# Patient Record
Sex: MALE | Race: BLACK OR AFRICAN AMERICAN | Employment: FULL TIME | URBAN - METROPOLITAN AREA
[De-identification: names, ages, dates, MRNs, and addresses within clinical notes are randomized per-mention and may not be internally consistent; named-entity substitution may affect disease eponyms.]

---

## 2017-10-02 ENCOUNTER — HOSPITAL ENCOUNTER (OUTPATIENT)
Dept: ULTRASOUND IMAGING | Age: 57
Discharge: HOME | End: 2017-10-02
Attending: INTERNAL MEDICINE
Payer: COMMERCIAL

## 2017-10-02 DIAGNOSIS — M79.661 PAIN AND SWELLING OF LOWER LEG, RIGHT: ICD-10-CM

## 2017-10-02 DIAGNOSIS — M79.89 PAIN AND SWELLING OF LOWER LEG, RIGHT: ICD-10-CM

## 2017-10-02 PROCEDURE — 93971 EXTREMITY STUDY: CPT

## 2017-12-14 PROBLEM — R73.01 IMPAIRED FASTING GLUCOSE: Status: ACTIVE | Noted: 2017-12-14

## 2017-12-14 PROBLEM — J45.30 MILD PERSISTENT ASTHMA WITHOUT COMPLICATION: Status: ACTIVE | Noted: 2017-12-14

## 2018-01-04 ENCOUNTER — APPOINTMENT (RX ONLY)
Dept: URBAN - METROPOLITAN AREA CLINIC 349 | Facility: CLINIC | Age: 58
Setting detail: DERMATOLOGY
End: 2018-01-04

## 2018-01-04 DIAGNOSIS — L82.1 OTHER SEBORRHEIC KERATOSIS: ICD-10-CM

## 2018-01-04 DIAGNOSIS — D22 MELANOCYTIC NEVI: ICD-10-CM

## 2018-01-04 PROBLEM — D22.5 MELANOCYTIC NEVI OF TRUNK: Status: ACTIVE | Noted: 2018-01-04

## 2018-01-04 PROCEDURE — ? PATHOLOGY BILLING

## 2018-01-04 PROCEDURE — ? COUNSELING

## 2018-01-04 PROCEDURE — 99242 OFF/OP CONSLTJ NEW/EST SF 20: CPT | Mod: 25

## 2018-01-04 PROCEDURE — A4550 SURGICAL TRAYS: HCPCS

## 2018-01-04 PROCEDURE — ? SHAVE REMOVAL

## 2018-01-04 PROCEDURE — 88305 TISSUE EXAM BY PATHOLOGIST: CPT

## 2018-01-04 PROCEDURE — 11301 SHAVE SKIN LESION 0.6-1.0 CM: CPT

## 2018-01-04 ASSESSMENT — LOCATION DETAILED DESCRIPTION DERM
LOCATION DETAILED: LEFT RADIAL DORSAL HAND
LOCATION DETAILED: RIGHT RADIAL DORSAL HAND
LOCATION DETAILED: RIGHT DISTAL PRETIBIAL REGION
LOCATION DETAILED: RIGHT LATERAL BUTTOCK

## 2018-01-04 ASSESSMENT — LOCATION SIMPLE DESCRIPTION DERM
LOCATION SIMPLE: RIGHT PRETIBIAL REGION
LOCATION SIMPLE: RIGHT BUTTOCK
LOCATION SIMPLE: LEFT HAND
LOCATION SIMPLE: RIGHT HAND

## 2018-01-04 ASSESSMENT — LOCATION ZONE DERM
LOCATION ZONE: HAND
LOCATION ZONE: TRUNK
LOCATION ZONE: LEG

## 2018-01-04 NOTE — HPI: SKIN LESIONS
How Severe Is Your Skin Lesion?: moderate
Have Your Skin Lesions Been Treated?: not been treated
Is This A New Presentation, Or A Follow-Up?: Skin Lesions
Additional History: Has been having some leg pain.

## 2018-01-04 NOTE — PROCEDURE: SHAVE REMOVAL
Notification Instructions: Patient will be notified of biopsy results. However, patient instructed to call the office if not contacted within 2 weeks.
Hemostasis: Electrocautery
Consent was obtained from the patient. The risks and benefits to therapy were discussed in detail. Specifically, the risks of infection, scarring, bleeding, prolonged wound healing, incomplete removal, allergy to anesthesia, nerve injury and recurrence were addressed. Prior to the procedure, the treatment site was clearly identified and confirmed by the patient. All components of Universal Protocol/PAUSE Rule completed.
Medical Necessity Information: It is in your best interest to select a reason for this procedure from the list below. All of these items fulfill various CMS LCD requirements except the new and changing color options.
Biopsy Method: Dermablade
Size Of Lesion In Cm (Required): 0.7
Render Post-Care Instructions In Note?: yes
Anesthesia Volume In Cc: 0.5
Bill 65218 For Specimen Handling/Conveyance To Laboratory?: no
Accession #: [email or web address]
Path Notes (To The Dermatopathologist): Please check margins.
Anesthesia Type: 2% lidocaine with epinephrine
Billing Type: Third-Party Bill
X Size Of Lesion In Cm (Optional): 0
Medical Necessity Clause: This procedure was medically necessary because the lesion that was treated was: changing and rubbed by clothing.
Wound Care: Vaseline
Detail Level: Detailed
Post-Care Instructions: I reviewed with the patient in detail post-care instructions. Patient is to keep the biopsy site dry overnight, and then apply vaseline twice daily until healed. Patient may apply hydrogen peroxide soaks to remove any crusting. After the procedure, the patient was observed for 5-10 minutes and was oriented to person, place and time and demied feeling dizzy, queasy, and stated that they did not feel that they were going to faint.

## 2018-09-06 PROBLEM — M79.89 RIGHT LEG SWELLING: Status: ACTIVE | Noted: 2017-10-09

## 2018-09-06 PROBLEM — K64.8 INTERNAL HEMORRHOIDS WITHOUT COMPLICATION: Status: ACTIVE | Noted: 2018-09-06

## 2018-09-06 PROBLEM — K57.30 DIVERTICULOSIS OF COLON WITHOUT DIVERTICULITIS: Status: ACTIVE | Noted: 2018-09-06

## 2018-09-06 PROBLEM — E66.01 SEVERE OBESITY (BMI 35.0-39.9): Status: ACTIVE | Noted: 2018-09-06

## 2018-09-06 PROBLEM — M25.569 KNEE PAIN: Status: ACTIVE | Noted: 2017-10-09

## 2018-12-20 PROBLEM — Z83.3 FAMILY HISTORY OF DIABETES MELLITUS: Status: ACTIVE | Noted: 2018-12-20

## 2018-12-20 PROBLEM — J30.9 ALLERGIC RHINITIS: Status: ACTIVE | Noted: 2018-12-20

## 2019-07-25 ENCOUNTER — HOSPITAL ENCOUNTER (OUTPATIENT)
Dept: GENERAL RADIOLOGY | Age: 59
Discharge: HOME | End: 2019-07-25

## 2019-07-25 DIAGNOSIS — M54.6 ACUTE MIDLINE THORACIC BACK PAIN: ICD-10-CM

## 2019-09-04 ENCOUNTER — HOSPITAL ENCOUNTER (OUTPATIENT)
Dept: GENERAL RADIOLOGY | Age: 59
Discharge: HOME | End: 2019-09-04

## 2019-09-04 DIAGNOSIS — M79.18 LEFT BUTTOCK PAIN: ICD-10-CM

## 2019-09-04 DIAGNOSIS — G89.29 CHRONIC LEFT-SIDED LOW BACK PAIN WITHOUT SCIATICA: ICD-10-CM

## 2019-09-04 DIAGNOSIS — M54.50 CHRONIC LEFT-SIDED LOW BACK PAIN WITHOUT SCIATICA: ICD-10-CM

## 2019-09-05 NOTE — PROGRESS NOTES
Pt was made aware of his x-ray and aware [address] [place] [address] will be calling him to set up PT apps.

## 2019-09-19 NOTE — THERAPY EVALUATION
Heidi Jackson  : 1960  Payor: Alissa Read / Plan: [facility] / Product Type: PPO /  [address] at [address], [place], [address] [address]  [phone]   [phone]         OUTPATIENT PHYSICAL THERAPY:Initial Assessment 2019   ICD-10: Treatment Diagnosis:         Low back pain (M54.5)           Sciatica, left side (M54.32)        Lumbago with sciatica, left side (M54.42)    Muscle weakness (generalized) (M62.81)       Precautions/Allergies:   Patient has no known allergies. TREATMENT PLAN:  Effective Dates: 2019 TO 2019 (90 days). Frequency/Duration: 2 times a week for 90 Day(s) MEDICAL/REFERRING DIAGNOSIS:  Chronic left-sided low back pain without sciatica [M54.5, G89.29]   DATE OF ONSET: *2 weeks  REFERRING PHYSICIAN: Aaliyah Olsen MD Orders: *Eval and Treat  Return MD Appointment: *19     INITIAL ASSESSMENT:  Mr. Rajiv Holland presents with hx of low back pain--currently, he is not having symptoms today; however he reports previous onset of radicular symptoms L LE not long ago (2 weeks). He has been taking anti inflammatory which has helped. We went through exercises to make sure he has good strength/flexibility regimen at home. No pelvic malalignment upon initial evaluation but decreased posture. Heidi Jackson will benefit from skilled PT (medically necessary) to address above deficits affecting participation in basic ADLs and overall functional tolerance. Manual techniques (stretching, joint mobilizations, soft tissue mobilization/myofascial release), postural exercises/education, therapeutic techniques/activities, and HEP will be performed as appropriate addressing Mayela Starr's current condition. PROBLEM LIST (Impacting functional limitations):  1. Decreased Strength  2. Decreased ADL/Functional Activities  3. Decreased Transfer Abilities  4.  Decreased Ambulation Ability/Technique  5. Decreased Balance  6. Increased Pain  7. Decreased Activity Tolerance  8. Decreased Schley with Home Exercise Program INTERVENTIONS PLANNED: (Treatment may consist of any combination of the following)  1. Balance Exercise  2. Cold  3. Family Education  4. Gait Training  5. Home Exercise Program (HEP)  6. Manual Therapy  7. Neuromuscular Re-education/Strengthening  8. Range of Motion (ROM)  9. Therapeutic Activites  10. Therapeutic Exercise/Strengthening  11. Transcutaneous Electrical Nerve Stimulation (TENS)  12. Ultrasound (US)     GOALS: (Goals have been discussed and agreed upon with patient.)  Short Term Goals 4 weeks   1. Rodolfo Rolle will be independent with HEP  2. Rodolfo Rolle will *be able to perform work related duties without onset of pain and improved mobility. [address] [place] [address] will *be able to walk >30 minutes with <=1/10 pain and no difficulty. [address] [place] [address] will participate in static and dynamic balance activities to decrease the risk for falls  5. Rodolfo Rolle will tolerate manual therapy/joint mobilizations/soft tissue to increase ROM and decrease pain  6. Long Term Goals 8 weeks   1. Rodolfo Rolle will demonstrate a 10 point improvement on the Oswestry to show improvement in function  2. Rodolfo Rolle will report 0/10 pain at rest and during ADLs  3. Rodolfo Rolle will demonstrate 5/5 LE strength on manual muscle testing      OUTCOME MEASURE:   Tool Used: Modified Oswestry Low Back Pain Questionnaire  Score:  Initial: *29/50  Most Recent: X/50 (Date: -- )   Interpretation of Score: Each section is scored on a 0-5 scale, 5 representing the greatest disability. The scores of each section are added together for a total score of 50. MEDICAL NECESSITY:   · Skilled intervention continues to be required due to above signs and symptoms affecting Sandi Sandss ability to participate in ADLs and overall QOL.   REASON FOR SERVICES/OTHER COMMENTS:  · Patient continues to require skilled intervention due to patient unable to attend/participate in therapy as expected  · . Total Duration:       Rehabilitation Potential For Stated Goals: Good  Regarding Shanae Starr's therapy, I certify that the treatment plan above will be carried out by a therapist or under their direction. Thank you for this referral,  PARKER HendersonT     Referring Physician Signature: Julianne Face _______________________________ Date _____________     PAIN/SUBJECTIVE:   Initial:   *0/10 back pain Post Session:  0/10   HISTORY:   History of Injury/Illness (Reason for Referral):  Amy Fried states that his back---\"it's a funny deal here. .. About 2 weeks I do heat and A/C and maybe I picked something up. Back pain comes and goes for me. I don't have a lot of pain today. The pain comes and goes. Last time I didn't do anythign and my back hurt and I am not sure what makes my back hurt. I don't have pain every day. Only when I overwork myself. Sometimes I can go months without back pain. I haven't had back pain in a few days or so. Past Medical History/Comorbidities:   Mr. Rajendra Galvan  has a past medical history of Wrist pain. He also has no past medical history of CAD (coronary artery disease). Mr. Rajendra Galvan  has no past surgical history on file.     Active Ambulatory Problems     Diagnosis Date Noted    Mild persistent asthma without complication 10/71/6623    Impaired fasting glucose 12/14/2017    Diverticulosis of colon without diverticulitis 09/06/2018    Internal hemorrhoids without complication 73/36/8089    Knee pain 10/09/2017    Right leg swelling 10/09/2017    Severe obesity (BMI 35.0-35.9 with comorbidity) ([facility] [address] 09/06/2018    Allergic rhinitis 12/20/2018    Family history of diabetes mellitus 12/20/2018     Resolved Ambulatory Problems     Diagnosis Date Noted    No Resolved Ambulatory Problems     Past Medical History:   Diagnosis Date    Wrist pain        Social History/Living Environment:        Social History     Socioeconomic History    Marital status:      Spouse name: Not on file    Number of children: Not on file    Years of education: Not on file    Highest education level: Not on file   Occupational History    Not on file   Social Needs    Financial resource strain: Not on file    Food insecurity:     Worry: Not on file     Inability: Not on file    Transportation needs:     Medical: Not on file     Non-medical: Not on file   Tobacco Use    Smoking status: Never Smoker    Smokeless tobacco: Never Used   Substance and Sexual Activity    Alcohol use: No    Drug use: No    Sexual activity: Not on file   Lifestyle    Physical activity:     Days per week: Not on file     Minutes per session: Not on file    Stress: Not on file   Relationships    Social connections:     Talks on phone: Not on file     Gets together: Not on file     Attends [employer] service: Not on file     Active member of club or organization: Not on file     Attends meetings of clubs or organizations: Not on file     Relationship status: Not on file    Intimate partner violence:     Fear of current or ex partner: Not on file     Emotionally abused: Not on file     Physically abused: Not on file     Forced sexual activity: Not on file   Other Topics Concern    Not on file   Social History Narrative    Not on file      Prior Level of Function/Work/Activity:  Occasional low back pain that is radicular in nature (changes R vs L side). Personal Factors:          Sex:  male        Age:  59 y.o. Ambulatory/Rehab Services H2 Model Falls Risk Assessment   Risk Factors:       (1)  Gender [Male] Ability to Rise from Chair:       (0)  Ability to rise in a single movement   Falls Prevention Plan:       No modifications necessary   Total: (5 or greater = High Risk): 1   ©2010 [employer]. All Rights Reserved.  [place] Patent [id number]. Federal Law prohibits the replication, distribution or use without written permission from [facility]   Current Medications:       Current Outpatient Medications:     ibuprofen (MOTRIN) 800 mg tablet, TAKE 1 TAB BY MOUTH THREE (3) TIMES DAILY (WITH MEALS). (Patient taking differently: Take 800 mg by mouth three (3) times daily as needed.), Disp: 90 Tab, Rfl: 1    SALINE NASAL 0.65 % nasal spray, USE 4 SPRAYS IN EACH NOSTRIL 4 TIMES A DAY AS NEEDED, Disp: , Rfl: 5    azelastine (OPTIVAR) 0.05 % ophthalmic solution, PLACE 1 DROP IN AFFECTED EYE TWICE A DAY AS NEEDED, Disp: , Rfl: 5    fluticasone-vilanterol (BREO ELLIPTA) 100-25 mcg/dose inhaler, Take 1 Puff by inhalation daily. (Patient taking differently: Take 1 Puff by inhalation daily as needed.), Disp: 1 Inhaler, Rfl: 12    fluticasone (FLONASE) 50 mcg/actuation nasal spray, 2 Sprays by Both Nostrils route daily as needed for Rhinitis., Disp: , Rfl:    Date Last Reviewed:  9/19/2019    Number of Personal Factors/Comorbidities that affect the Plan of Care: 3+: HIGH COMPLEXITY   EXAMINATION:   Observation/Orthostatic Postural Assessment:          Overweight. Palpation:          No tenderness    Special Tests:               PETE=Negative              SLR (<60 degrees)=Negative              SLUMP=Negative                                  SI Joint Tests: Negative Gaenslen, PETE, Thigh Thrust, ASIS Distraction, Sacral Compression    Lower Quarter Screen Eval Date:  Re-Assess Date:  Re-Assess Date:    Active ROM of Lumbar Spine  RIGHT LEFT RIGHT LEFT RIGHT LEFT          Flexion WFL               Extension WFL               Side flexion [facility] WFL              Rotation [facility] WF       Deep Squat (for general function) Can perform, slowly. Single Leg Stance (Bilateral) or Sidelying Hip Abduction (L5, S1) Great balance.          Toe Walking (S1) No issues        Heel walking (L4,5) No issues                          MMT Resisted Hip Flexion (L1,2) 5/5  5/5        Resisted Knee Flexion  5/5  5/5        Resisted Knee Extension (L3,4) 5/5 5/5       Great Toe Extension (L5)         Sensory Examination (EYES SHUT; dermatomes, use cotton ball on bare skin; L2 top of thigh, L3 Medial Knee; L4 Lateral Thigh, L5 Web space of great toe, S1 outside of foot, S2 inside of foot)     Normal Normal       Reflex testing (0, 1+, 2+, 3+, 4+)              Patella 2+ 2+            Achilles         Hip Abduction 5/5 5/5                ROM         Hip flexion                                 Neurological Screen:    RADIATING SYMPTOMS?: YES/NO---None  Curently. Functional Mobility:  Affecting participation in basic ADLs and functional tasks. Balance:          Great. Body Structures Involved:  1. Nerves  2. Bones  3. Joints  4. Muscles  5. Ligaments Body Functions Affected:  1. Sensory/Pain  2. Cardio  3. Neuromusculoskeletal  4. Movement Related  5. Skin Related Activities and Participation Affected:  1. Learning and Applying Knowledge  2. General Tasks and Demands  3. Communication  4. Mobility  5.  Self Care   Number of elements (examined above) that affect the Plan of Care: 4+: HIGH COMPLEXITY   CLINICAL PRESENTATION:   Presentation: Stable and uncomplicated: LOW COMPLEXITY   CLINICAL DECISION MAKING:   Use of outcome tool(s) and clinical judgement create a POC that gives a: Clear prediction of patient's progress: LOW COMPLEXITY     Samantha Solorio DPT

## 2019-09-19 NOTE — PROGRESS NOTES
Griffin Butts  : 1960  Payor: Yenni Petty / Plan: [facility] / Product Type: O /  [address] [facility] at [address] [place] [address]. [facility], [address], [facility], [address] [place] [address]  [phone]   [phone]                                                               OUTPATIENT PHYSICAL THERAPY: Daily Treatment Note 2019   Visit Count:  1    ICD-10: Treatment Diagnosis:         Low back pain (M54.5)           Sciatica, left side (M54.32)        Lumbago with sciatica, left side (M54.42)    Muscle weakness (generalized) (M62.81)       Precautions/Allergies:   Patient has no known allergies. TREATMENT PLAN:  Effective Dates: 2019 TO 2019 (90 days). Frequency/Duration: 2 times a week for 90 Day(s) MEDICAL/REFERRING DIAGNOSIS:  Chronic left-sided low back pain without sciatica [M54.5, G89.29]   DATE OF ONSET: *2 weeks  REFERRING PHYSICIAN: Marylou Alvarez*  MD Orders: *Eval and Treat  Return MD Appointment: *19     Pre-treatment Symptoms/Complaints:  *Currently I'm no in pain right now. Pain: Initial:   *0/10 Post Session:  0/10   Medications Last Reviewed:  2019   Updated Objective Findings:  None today---Overall good strength and ROM. TREATMENT:   Therapeutic Exercises: (23 minutes):  Exercises per grid below to improve mobility, strength and balance. Required minimal visual, verbal and manual cues to promote proper body alignment and promote proper body posture. Progressed resistance and complexity of movement as indicated. Date:  *19 Date:   Date:     Activity/Exercise Parameters Parameters Parameters   *SKTC 30\"x3     LTR 10x10\"     Bridges 10x10\"     Hamstring stretch 30\"X3     PT education on importance of stretching/flexibility. Manual Therapy Interventions: (* minutes): Manual interventions performed to improve joint/soft tissue mobility and ROM to improve ability to perform ADLs. Patient responded well to all manual interventions with no significant increase in pain/symptoms. Improved pain reported below. Technique Used Grade  Level # Time(s) Effect while being performed                                                         [employer]  Treatment/Session Summary:  Jacki De verbalized understanding of role of PT and POC. *  · Response to Treatment:  No increase in pain or adverse reactions  · Communication/Consultation:  Faxed initial evaluation to MD  · Equipment provided today:  HEP. Recommendations/Intent for next treatment session: Next visit will focu  s on ROM, strength.     Total Treatment Billable Duration:  23 Rx  PT Patient Time In/Time Out  Time In: 2821  [facility]: [facility] Flow, DPT    Future Appointments   Date Time Provider Allan Landa   12/16/2019  8:00 AM [facility]   12/23/2019  8:00 AM Eileen Galvez MD [facility]

## 2019-09-20 ENCOUNTER — HOSPITAL ENCOUNTER (OUTPATIENT)
Dept: PHYSICAL THERAPY | Age: 59
Discharge: HOME | End: 2019-09-20
Payer: COMMERCIAL

## 2019-09-20 DIAGNOSIS — G89.29 CHRONIC LEFT-SIDED LOW BACK PAIN WITHOUT SCIATICA: ICD-10-CM

## 2019-09-20 DIAGNOSIS — M54.50 CHRONIC LEFT-SIDED LOW BACK PAIN WITHOUT SCIATICA: ICD-10-CM

## 2019-09-20 PROCEDURE — 97110 THERAPEUTIC EXERCISES: CPT

## 2019-09-20 PROCEDURE — 97161 PT EVAL LOW COMPLEX 20 MIN: CPT

## 2019-12-19 PROBLEM — J45.30 MILD PERSISTENT ASTHMA WITHOUT COMPLICATION: Status: RESOLVED | Noted: 2017-12-14 | Resolved: 2019-12-19

## 2019-12-19 PROBLEM — E78.6 LOW HDL (UNDER 40): Status: ACTIVE | Noted: 2019-12-19

## 2019-12-19 PROBLEM — J45.20 MILD INTERMITTENT ASTHMA WITHOUT COMPLICATION: Status: ACTIVE | Noted: 2019-12-19

## 2019-12-27 NOTE — THERAPY EVALUATION
Charlotte Moody  : 1960  Payor: Abelardo Payne / Plan: [facility] / Product Type: PPO /  [address] [facility] at [address] [place] [address]. [address] [place] [address] [address] [place] [address], [facility], [address] [place] [address]  [phone]   [phone]         OUTPATIENT PHYSICAL THERAPY:Discharge Summary 2019   ICD-10: Treatment Diagnosis:         Low back pain (M54.5)           Sciatica, left side (M54.32)        Lumbago with sciatica, left side (M54.42)    Muscle weakness (generalized) (M62.81)       Precautions/Allergies:   Patient has no known allergies. TREATMENT PLAN:  Effective Dates: 2019 TO 2019 (90 days). Frequency/Duration: 2 times a week for 90 Day(s) MEDICAL/REFERRING DIAGNOSIS:  Chronic left-sided low back pain without sciatica [M54.5, G89.29]   DATE OF ONSET: *2 weeks  REFERRING PHYSICIAN: Camilo Howe MD Orders: *Eval and Treat  Return MD Appointment: *19       UPDATE:  Charlotte Moody continued to have no symptoms after initial evlauation--resultantly case DC'd. 2019 INITIAL ASSESSMENT:  Mr. Dariel Wilson presents with hx of low back pain--currently, he is not having symptoms today; however he reports previous onset of radicular symptoms L LE not long ago (2 weeks). He has been taking anti inflammatory which has helped. We went through exercises to make sure he has good strength/flexibility regimen at home. No pelvic malalignment upon initial evaluation but decreased posture.          Bruna Guillaume, PARKERT

## 2020-04-17 PROBLEM — M54.50 ACUTE RIGHT-SIDED LOW BACK PAIN WITHOUT SCIATICA: Status: ACTIVE | Noted: 2020-04-17
